# Patient Record
Sex: MALE | Employment: STUDENT | URBAN - METROPOLITAN AREA
[De-identification: names, ages, dates, MRNs, and addresses within clinical notes are randomized per-mention and may not be internally consistent; named-entity substitution may affect disease eponyms.]

---

## 2017-05-24 NOTE — PATIENT INSTRUCTIONS
We will let you know your test results as discussed: by phone for urgent results, otherwise by postal mail.     Please try to exercise and eat healthy to lose some weight and follow the other tips discussed at your visit.    Preventive Health Recommendations  Male Ages 18 - 25     Yearly exam:             See your health care provider every year in order to  o   Review health changes.   o   Discuss preventive care.    o   Review your medicines if your doctor has prescribed any.    You should be tested each year for STDs (sexually transmitted diseases).     Talk to your provider about cholesterol testing.      If you are at risk for diabetes, you should have a diabetes test (fasting glucose).    Shots: Get a flu shot each year. Get a tetanus shot every 10 years.     Nutrition:    Eat at least 5 servings of fruits and vegetables daily.     Eat whole-grain bread, whole-wheat pasta and brown rice instead of white grains and rice.     Talk to your provider about calcium and Vitamin D.     Lifestyle    Exercise for at least 150 minutes a week (30 minutes a day, 5 days a week). This will help you control your weight and prevent disease.     Limit alcohol to one drink per day.     No smoking.     Wear sunscreen to prevent skin cancer.     See your dentist every six months for an exam and cleaning.     Monthly Mole Check Chart    To do your monthly mole check, make copies of this chart. Then, fill in the date, the number of moles on each part of your body, and a description of each mole. For moles on your back or other areas you can't see, have a family member or friend do this for you. Be sure to use the ABCDEs of skin checks. This means checking moles for    Asymmetry (1 half looks different than the other half)    Border (regular is good, irregular is bad)    Color (varies from 1 area to another and may be tan, brown, or black)    Diameter (bigger than a pencil eraser)    Evolving (changing in size, shape, or  color).   Keep all of your completed charts and use them to track changes in your moles over time.    Seeking medical treatment  See your health care provider if your moles hurt, itch, ooze, bleed, thicken, or become crusty. Call your health care provider if your moles show signs of melanoma. These include a mole that has:      Asymmetry. The sides of the mole don t match    Border. The edges are ragged, notched, or blurred    Color. The color within the mole varies    Diameter. The mole is larger than 6 mm (size of a pencil eraser)    Evolving. The mole is getting larger or the shape or color of the mole is changing       0966-1123 [employer]. [address] [place] [address], [place] [id number]. All rights reserved. This information is not intended as a substitute for professional medical care. Always follow your healthcare professional's instructions.          Weight Management: Healthy Eating  Food is your body s fuel. You can t live without it. The key is to give your body enough nutrients and energy without eating too much. Reading food labels can help you make healthy choices. Also, learn new eating habits to manage your weight.     All the values on the label are based on one serving. The serving size is the average portion. Remember to multiply the values on the label by the number of servings you eat.   Eat less fat  A gram of fat has almost 2.5 times the calories of a gram of protein or carbohydrates. Try to balance your food choices so that only 20% to 35% of your calories comes from total fat. This means an average of 2  to 3  grams of fat for each 100 calories you eat.  Eat more fiber  High-fiber foods are digested more slowly than low-fiber foods, so you feel full longer. Try to get 31 grams of fiber each day. Foods high in fiber include:    Vegetables and fruits    Whole-grain or bran breads, pastas, and cereals    Legumes (beans) and peas  As you begin to eat more fiber, be sure to drink plenty  of water to keep your digestive system working smoothly.  Tips    Don t skip meals. This often leads to overeating later on. It s best to spread your eating throughout the day.    Eat a variety of foods, not just a few favorites.    If you find yourself eating when you re not hungry, ask yourself why. Many of us eat when we re bored, stressed, or just to be polite. Listen to your body. If you re not hungry, get busy doing something else instead of eating.    Eat slower, shooting for 20 to 30 minutes for each meal. It takes 20 minutes for your stomach to tell your brain that it s full.    Pay attention to what you eat. Don t read or watch TV during your meal.    6100-8475 The [employer]. [address] [place] [address], [place] [address]. All rights reserved. This information is not intended as a substitute for professional medical care. Always follow your healthcare professional's instructions.

## 2017-05-24 NOTE — PROGRESS NOTES
SUBJECTIVE:     CC: Steve Doran is an 27 year old male who presents for preventative health visit.     Healthy Habits:    Do you get at least three servings of calcium containing foods daily (dairy, green leafy vegetables, etc.)? yes    Amount of exercise or daily activities, outside of work: 3 day(s) per week    Problems taking medications regularly not applicable    Medication side effects: No    Have you had an eye exam in the past two years? yes    Do you see a dentist twice per year? 1 time yearly    Do you have sleep apnea, excessive snoring or daytime drowsiness?no    Would like to have his measles immune status checked. Will soon become a father.  Intermittent ETOH, tobacco and MJ use; advised moderation in ETOH and to avoid MJ and tobacco.    Has 2 moles he would like to have us take a look at; neither appear to have changed in the past month or so. No family history of skin cancer.       Today's PHQ-2 Score:   PHQ-2 ( 1999 Pfizer) 3/19/2013   Q1: Little interest or pleasure in doing things 0   Q2: Feeling down, depressed or hopeless 0   PHQ-2 Score 0       Abuse: Current or Past(Physical, Sexual or Emotional)- No  Do you feel safe in your environment - Yes    Social History   Substance Use Topics     Smoking status: Current Some Day Smoker     Smokeless tobacco: Never Used     Alcohol use Yes      Comment: 2 drinks a week     The patient does not drink >3 drinks per day nor >7 drinks per week.    Last PSA: No results found for: PSA    No results for input(s): CHOL, HDL, LDL, TRIG, CHOLHDLRATIO, NHDL in the last 96293 hours.    Reviewed orders with patient. Reviewed health maintenance and updated orders accordingly - Yes    Reviewed and updated as needed this visit by clinical staff         Reviewed and updated as needed this visit by Provider            ROS:  C: NEGATIVE for fever, chills, change in weight  I: NEGATIVE for worrisome rashes, moles or lesions  E: NEGATIVE for vision changes or  "irritation  ENT: NEGATIVE for ear, mouth and throat problems  R: NEGATIVE for significant cough or SOB  CV: NEGATIVE for chest pain, palpitations or peripheral edema  GI: NEGATIVE for nausea, abdominal pain, heartburn, or change in bowel habits   male: negative for dysuria, hematuria, decreased urinary stream, erectile dysfunction, urethral discharge  M: NEGATIVE for significant arthralgias or myalgia  N: NEGATIVE for weakness, dizziness or paresthesias  P: NEGATIVE for changes in mood or affect    Problem list, Medication list, Allergies, and Medical/Social/Surgical histories reviewed in [facility] and updated as appropriate.  OBJECTIVE:     /80  Pulse 63  Temp 97.9  F (36.6  C) (Oral)  Ht 5' 11\" (1.803 m)  Wt 218 lb (98.9 kg)  SpO2 99%  BMI 30.4 kg/m2  EXAM:  GENERAL: healthy, alert and no distress  EYES: Eyes grossly normal to inspection, PERRL and conjunctivae and sclerae normal  HENT: ear canals and TM's normal, nose and mouth without ulcers or lesions  NECK: no adenopathy, no asymmetry, masses, or scars and thyroid normal to palpation  RESP: lungs clear to auscultation - no rales, rhonchi or wheezes  CV: regular rate and rhythm, normal S1 S2, no S3 or S4, no murmur, click or rub, no peripheral edema and peripheral pulses strong  ABDOMEN: soft, nontender, no hepatosplenomegaly, no masses and bowel sounds normal  MS: no gross musculoskeletal defects noted, no edema  SKIN:   There is a ~ 1cm lesion on the left chest and a 0.8cm in diameter, more papular lesion on the la.  no suspicious lesions or rashes  NEURO: Normal strength and tone, mentation intact and speech normal  PSYCH: mentation appears normal, affect normal/bright      Results for orders placed or performed in visit on 05/25/17   Rubella Antibody IgG Quantitative   Result Value Ref Range    Rubella Antibody IgG Quantitative 10 IU/mL   Lipid Profile with reflex to direct LDL   Result Value Ref Range    Cholesterol 198 <200 mg/dL    " "Triglycerides 35 <150 mg/dL    HDL Cholesterol 68 >39 mg/dL    LDL Cholesterol Calculated 123 (H) <100 mg/dL    Non HDL Cholesterol 130 (H) <130 mg/dL   Glucose   Result Value Ref Range    Glucose 81 70 - 99 mg/dL   Rubeola Antibody IgG   Result Value Ref Range    Rubeola (Measles) Antibody IgG 1.9 (H) 0.0 - 0.8 AI      ASSESSMENT/PLAN:         ICD-10-CM    1. Routine general medical examination at a health care facility Z00.00 Lipid Profile with reflex to direct LDL     Glucose   2. Immunity status testing Z01.84 Rubella Antibody IgG Quantitative     Rubeola Antibody IgG     Patient Instructions:  We will let you know your test results as discussed: by phone for urgent results, otherwise by postal mail.     Please try to exercise and eat healthy to lose some weight and follow the other tips discussed at your visit.    Also given handouts (from [employer]) regarding monthly mole checks and tips regarding weight loss.     COUNSELING:  Reviewed preventive health counseling, as reflected in patient instructions         reports that he has been smoking.  He has never used smokeless tobacco. advised to try to quit tobacco use.    Estimated body mass index is 26.22 kg/(m^2) as calculated from the following:    Height as of 3/19/13: 5' 11\" (1.803 m).    Weight as of 3/19/13: 188 lb (85.3 kg).   Weight management plan: Discussed healthy diet and exercise guidelines and patient will follow up in 12 months in clinic to re-evaluate.    Counseling Resources:  ATP IV Guidelines  Pooled Cohorts Equation Calculator  FRAX Risk Assessment  ICSI Preventive Guidelines  Dietary Guidelines for Americans, 2010  USDA's MyPlate  ASA Prophylaxis  Lung CA Screening    Cammy Castaneda MD  [facility]  "

## 2017-05-25 ENCOUNTER — OFFICE VISIT (OUTPATIENT)
Dept: FAMILY MEDICINE | Facility: CLINIC | Age: 27
End: 2017-05-25
Payer: COMMERCIAL

## 2017-05-25 VITALS
BODY MASS INDEX: 30.52 KG/M2 | OXYGEN SATURATION: 99 % | HEART RATE: 63 BPM | HEIGHT: 71 IN | WEIGHT: 218 LBS | TEMPERATURE: 97.9 F | SYSTOLIC BLOOD PRESSURE: 130 MMHG | DIASTOLIC BLOOD PRESSURE: 80 MMHG

## 2017-05-25 DIAGNOSIS — Z01.84 IMMUNITY STATUS TESTING: ICD-10-CM

## 2017-05-25 DIAGNOSIS — Z00.00 ROUTINE GENERAL MEDICAL EXAMINATION AT A HEALTH CARE FACILITY: Primary | ICD-10-CM

## 2017-05-25 LAB
CHOLEST SERPL-MCNC: 198 MG/DL
GLUCOSE SERPL-MCNC: 81 MG/DL (ref 70–99)
HDLC SERPL-MCNC: 68 MG/DL
LDLC SERPL CALC-MCNC: 123 MG/DL
NONHDLC SERPL-MCNC: 130 MG/DL
TRIGL SERPL-MCNC: 35 MG/DL

## 2017-05-25 PROCEDURE — 82947 ASSAY GLUCOSE BLOOD QUANT: CPT | Performed by: INTERNAL MEDICINE

## 2017-05-25 PROCEDURE — 99395 PREV VISIT EST AGE 18-39: CPT | Performed by: INTERNAL MEDICINE

## 2017-05-25 PROCEDURE — 86762 RUBELLA ANTIBODY: CPT | Performed by: INTERNAL MEDICINE

## 2017-05-25 PROCEDURE — 80061 LIPID PANEL: CPT | Performed by: INTERNAL MEDICINE

## 2017-05-25 PROCEDURE — 36415 COLL VENOUS BLD VENIPUNCTURE: CPT | Performed by: INTERNAL MEDICINE

## 2017-05-25 PROCEDURE — 86765 RUBEOLA ANTIBODY: CPT | Performed by: INTERNAL MEDICINE

## 2017-05-25 ASSESSMENT — PAIN SCALES - GENERAL: PAINLEVEL: NO PAIN (0)

## 2017-05-25 NOTE — NURSING NOTE
"Chief Complaint   Patient presents with     Physical       Initial /90  Pulse 63  Temp 97.9  F (36.6  C) (Oral)  Ht 5' 11\" (1.803 m)  Wt 218 lb (98.9 kg)  SpO2 99%  BMI 30.4 kg/m2 Estimated body mass index is 30.4 kg/(m^2) as calculated from the following:    Height as of this encounter: 5' 11\" (1.803 m).    Weight as of this encounter: 218 lb (98.9 kg).  Medication Reconciliation: complete   Nancy Bridges MA    "

## 2017-05-25 NOTE — MR AVS SNAPSHOT
After Visit Summary   5/25/2017    Steve Doran    MRN: [id number]           Patient Information     Date Of Birth          1990        Visit Information        Provider Department      5/25/2017 10:10 AM Cammy Castnaeda MD [facility]        Today's Diagnoses     Immunity status testing    -  1    Routine general medical examination at a health care facility          Care Instructions    We will let you know your test results as discussed: by phone for urgent results, otherwise by postal mail.     Please try to exercise and eat healthy to lose some weight and follow the other tips discussed at your visit.    Preventive Health Recommendations  Male Ages 18 - 25     Yearly exam:             See your health care provider every year in order to  o   Review health changes.   o   Discuss preventive care.    o   Review your medicines if your doctor has prescribed any.    You should be tested each year for STDs (sexually transmitted diseases).     Talk to your provider about cholesterol testing.      If you are at risk for diabetes, you should have a diabetes test (fasting glucose).    Shots: Get a flu shot each year. Get a tetanus shot every 10 years.     Nutrition:    Eat at least 5 servings of fruits and vegetables daily.     Eat whole-grain bread, whole-wheat pasta and brown rice instead of white grains and rice.     Talk to your provider about calcium and Vitamin D.     Lifestyle    Exercise for at least 150 minutes a week (30 minutes a day, 5 days a week). This will help you control your weight and prevent disease.     Limit alcohol to one drink per day.     No smoking.     Wear sunscreen to prevent skin cancer.     See your dentist every six months for an exam and cleaning.     Monthly Mole Check Chart    To do your monthly mole check, make copies of this chart. Then, fill in the date, the number of moles on each part of your body, and a description of each mole. For moles  on your back or other areas you can't see, have a family member or friend do this for you. Be sure to use the ABCDEs of skin checks. This means checking moles for    Asymmetry (1 half looks different than the other half)    Border (regular is good, irregular is bad)    Color (varies from 1 area to another and may be tan, brown, or black)    Diameter (bigger than a pencil eraser)    Evolving (changing in size, shape, or color).   Keep all of your completed charts and use them to track changes in your moles over time.    Seeking medical treatment  See your health care provider if your moles hurt, itch, ooze, bleed, thicken, or become crusty. Call your health care provider if your moles show signs of melanoma. These include a mole that has:      Asymmetry. The sides of the mole don t match    Border. The edges are ragged, notched, or blurred    Color. The color within the mole varies    Diameter. The mole is larger than 6 mm (size of a pencil eraser)    Evolving. The mole is getting larger or the shape or color of the mole is changing       9975-2645 [employer]. [address] [place] [address]. All rights reserved. This information is not intended as a substitute for professional medical care. Always follow your healthcare professional's instructions.          Weight Management: Healthy Eating  Food is your body s fuel. You can t live without it. The key is to give your body enough nutrients and energy without eating too much. Reading food labels can help you make healthy choices. Also, learn new eating habits to manage your weight.     All the values on the label are based on one serving. The serving size is the average portion. Remember to multiply the values on the label by the number of servings you eat.   Eat less fat  A gram of fat has almost 2.5 times the calories of a gram of protein or carbohydrates. Try to balance your food choices so that only 20% to 35% of your calories comes from  total fat. This means an average of 2  to 3  grams of fat for each 100 calories you eat.  Eat more fiber  High-fiber foods are digested more slowly than low-fiber foods, so you feel full longer. Try to get 31 grams of fiber each day. Foods high in fiber include:    Vegetables and fruits    Whole-grain or bran breads, pastas, and cereals    Legumes (beans) and peas  As you begin to eat more fiber, be sure to drink plenty of water to keep your digestive system working smoothly.  Tips    Don t skip meals. This often leads to overeating later on. It s best to spread your eating throughout the day.    Eat a variety of foods, not just a few favorites.    If you find yourself eating when you re not hungry, ask yourself why. Many of us eat when we re bored, stressed, or just to be polite. Listen to your body. If you re not hungry, get busy doing something else instead of eating.    Eat slower, shooting for 20 to 30 minutes for each meal. It takes 20 minutes for your stomach to tell your brain that it s full.    Pay attention to what you eat. Don t read or watch TV during your meal.    1031-8611 [employer]. [address] [place] [address], [place] [address]. All rights reserved. This information is not intended as a substitute for professional medical care. Always follow your healthcare professional's instructions.                Follow-ups after your visit        Who to contact     If you have questions or need follow up information about today's clinic visit or your schedule please contact [facility] directly at [phone].  Normal or non-critical lab and imaging results will be communicated to you by MyChart, letter or phone within 4 business days after the clinic has received the results. If you do not hear from us within 7 days, please contact the clinic through MyChart or phone. If you have a critical or abnormal lab result, we will notify you by phone as soon as possible.  Submit refill requests  "through [employer] or call your pharmacy and they will forward the refill request to us. Please allow 3 business days for your refill to be completed.          Additional Information About Your Visit        [employer] Information     [employer] lets you send messages to your doctor, view your test results, renew your prescriptions, schedule appointments and more. To sign up, go to [email or web address] . Click on \"Log in\" on the left side of the screen, which will take you to the Welcome page. Then click on \"Sign up Now\" on the right side of the page.     You will be asked to enter the access code listed below, as well as some personal information. Please follow the directions to create your username and password.     Your access code is: [email or web address]  Expires: 2017 11:19 AM     Your access code will  in 90 days. If you need help or a new code, please call your [facility] clinic or [phone].        Care EveryWhere ID     This is your Care EveryWhere ID. This could be used by other organizations to access your [facility] medical records  [id number]        Your Vitals Were     Pulse Temperature Height Pulse Oximetry BMI (Body Mass Index)       63 97.9  F (36.6  C) (Oral) 5' 11\" (1.803 m) 99% 30.4 kg/m2        Blood Pressure from Last 3 Encounters:   17 130/80   13 112/74    Weight from Last 3 Encounters:   17 218 lb (98.9 kg)   13 188 lb (85.3 kg)              We Performed the Following     Glucose     Lipid Profile with reflex to direct LDL     Rubella Antibody IgG Quantitative        Primary Care Provider    None Specified       No primary provider on file.        Thank you!     Thank you for choosing [facility]  for your care. Our goal is always to provide you with excellent care. Hearing back from our patients is one way we can continue to improve our services. Please take a few minutes to complete the written survey that you may receive in the mail after your visit with " us. Thank you!             Your Updated Medication List - Protect others around you: Learn how to safely use, store and throw away your medicines at www.disposemymeds.org.      Notice  As of 5/25/2017 11:19 AM    You have not been prescribed any medications.

## 2017-05-26 LAB
MEV IGG SER QL IA: 1.9 AI (ref 0–0.8)
RUBV IGG SERPL IA-ACNC: 10 IU/ML

## 2017-05-26 NOTE — PROGRESS NOTES
Dear Steve,     Your test results are attached.    Your fasting blood sugar is within normal limits.  You do not have evidence of diabetes.  Your cholesterol levels are within normal limits for you.  You have antibodies against measles, and would be considered to be immune against measles.      Please notify me via [employer] or contact the clinic at [phone] if you have any questions.    Cammy Castaneda MD

## 2017-11-27 ENCOUNTER — OFFICE VISIT (OUTPATIENT)
Dept: FAMILY MEDICINE | Facility: CLINIC | Age: 27
End: 2017-11-27
Payer: COMMERCIAL

## 2017-11-27 VITALS
OXYGEN SATURATION: 97 % | TEMPERATURE: 99.5 F | HEIGHT: 71 IN | BODY MASS INDEX: 30.52 KG/M2 | HEART RATE: 73 BPM | WEIGHT: 218 LBS | SYSTOLIC BLOOD PRESSURE: 106 MMHG | DIASTOLIC BLOOD PRESSURE: 70 MMHG

## 2017-11-27 DIAGNOSIS — J06.9 UPPER RESPIRATORY TRACT INFECTION, UNSPECIFIED TYPE: Primary | ICD-10-CM

## 2017-11-27 LAB
DEPRECATED S PYO AG THROAT QL EIA: NORMAL
FLUAV+FLUBV AG SPEC QL: NEGATIVE
FLUAV+FLUBV AG SPEC QL: NEGATIVE
SPECIMEN SOURCE: NORMAL
SPECIMEN SOURCE: NORMAL

## 2017-11-27 PROCEDURE — 99213 OFFICE O/P EST LOW 20 MIN: CPT | Performed by: FAMILY MEDICINE

## 2017-11-27 PROCEDURE — 87081 CULTURE SCREEN ONLY: CPT | Performed by: FAMILY MEDICINE

## 2017-11-27 PROCEDURE — 87804 INFLUENZA ASSAY W/OPTIC: CPT | Performed by: FAMILY MEDICINE

## 2017-11-27 PROCEDURE — 87880 STREP A ASSAY W/OPTIC: CPT | Performed by: FAMILY MEDICINE

## 2017-11-27 NOTE — PROGRESS NOTES
"  SUBJECTIVE:   Steve Doran is a 27 year old male who presents to clinic today for the following health issues:      RESPIRATORY SYMPTOMS      Duration: 36 hours    Description  nasal congestion, cough, fever to 101, headaches, lots of mucus and chest conngestion    Severity: severe    Accompanying signs and symptoms: None    History (predisposing factors):  none    Precipitating or alleviating factors: None    Therapies tried and outcome:  Mucinex and Nyquil which has helped    I have reviewed the patient's medical history in detail and updated the computerized patient record.     ROS:  CONSTITUTIONAL: POSITIVE for fever   I: NEGATIVE for worrisome rashes, moles or lesions  E: NEGATIVE for vision changes or irritation  ENT/MOUTH: nasal congestion, rhinorrhea-purulent and sinus pressure  RESP:cough-productive  CV: NEGATIVE for chest pain/chest pressure    OBJECTIVE:     /70 (BP Location: Right arm, Patient Position: Chair, Cuff Size: Adult Large)  Pulse 73  Temp 99.5  F (37.5  C) (Oral)  Ht 5' 11\" (1.803 m)  Wt 218 lb (98.9 kg)  SpO2 97%  BMI 30.4 kg/m2  Body mass index is 30.4 kg/(m^2).  GENERAL: alert, no distress and obese  EYES: Eyes grossly normal to inspection, PERRL and conjunctivae and sclerae normal  HENT: ear canals and TM's normal, nose and mouth without ulcers or lesions  NECK: no adenopathy, no asymmetry, masses, or scars and thyroid normal to palpation  RESP: lungs clear to auscultation - no rales, rhonchi or wheezes  CV: regular rate and rhythm, normal S1 S2, no S3 or S4, no murmur, click or rub   MS: extremities normal- no gross deformities noted  PSYCH: mentation appears normal, affect normal/bright    Diagnostic Test Results:  Results for orders placed or performed in visit on 11/27/17   Influenza A/B antigen   Result Value Ref Range    Influenza A/B Agn Specimen Nasopharyngeal     Influenza A Negative NEG^Negative    Influenza B Negative NEG^Negative   Rapid strep screen   Result Value " Ref Range    Specimen Description Throat     Rapid Strep A Screen       NEGATIVE: No Group A streptococcal antigen detected by immunoassay, await culture report.       ASSESSMENT/PLAN:   (J06.9) Upper respiratory tract infection, unspecified type  (primary encounter diagnosis)  Plan: Influenza A/B antigen, Rapid strep screen, Beta        strep group A culture        Symptomatic care.  Return to clinic for persistence, recurrence or new symptoms.           See Patient Instructions    Lakesha Copeland MD  [facility]

## 2017-11-27 NOTE — PATIENT INSTRUCTIONS
[facility]    If you have any questions regarding to your visit please contact your care team:       Team Red:   Clinic Hours Telephone Number   Dr. Lakesha Wells, NP   7am-7pm  Monday - Thursday   7am-5pm  Fridays  [phone]  (Appointment scheduling available 24/7)    Questions about your visit?   Team Line  [phone]   [facility] [place] [facility] 11am-9pm Monday-Friday Saturday-Sunday- 9am-5pm   [place] - 5pm-9pm Monday-Friday Saturday-Sunday- 9am-5pm  [phone] - [place]   [phone] - [place]       What options do I have for visits at the clinic other than the traditional office visit?  To expand how we care for you, many of our providers are utilizing electronic visits (e-visits) and telephone visits, when medically appropriate, for interactions with their patients rather than a visit in the clinic.   We also offer nurse visits for many medical concerns. Just like any other service, we will bill your insurance company for this type of visit based on time spent on the phone with your provider. Not all insurance companies cover these visits. Please check with your medical insurance if this type of visit is covered. You will be responsible for any charges that are not paid by your insurance.      E-visits via Quantum4D:  generally incur a $35.00 fee.  Telephone visits:  Time spent on the phone: *charged based on time that is spent on the phone in increments of 10 minutes. Estimated cost:   5-10 mins $30.00   11-20 mins. $59.00   21-30 mins. $85.00     Use Cinedigmt (secure email communication and access to your chart) to send your primary care provider a message or make an appointment. Ask someone on your Team how to sign up for Quantum4D.  For a Price Quote for your services, please call our Consumer Price Line at [phone].      As always, Thank you for trusting us with your health care needs!  Discharged  by ADRIAN Plummer

## 2017-11-27 NOTE — LETTER
November 27, 2017      Steve Doran  [address] [facility] [place] [address]        To Whom It May Concern:    Steve Doran was seen in our clinic. Excuse his absence today due to illness. He may return to work without restrictions.      Sincerely,        Lakesha Copeland MD

## 2017-11-27 NOTE — PROGRESS NOTES
Your results are normal.  Your final test results are pending.  Please check your chart again within 3 to 5 days. You will receive further instruction when your full test result panel is complete.    Lakesha Copeland MD

## 2017-11-27 NOTE — NURSING NOTE
"Chief Complaint   Patient presents with     URI       Initial /70 (BP Location: Right arm, Patient Position: Chair, Cuff Size: Adult Large)  Pulse 73  Temp 99.5  F (37.5  C) (Oral)  Ht 5' 11\" (1.803 m)  Wt 218 lb (98.9 kg)  SpO2 97%  BMI 30.4 kg/m2 Estimated body mass index is 30.4 kg/(m^2) as calculated from the following:    Height as of this encounter: 5' 11\" (1.803 m).    Weight as of this encounter: 218 lb (98.9 kg).  Medication Reconciliation: complete    "

## 2017-11-28 LAB
BACTERIA SPEC CULT: NORMAL
SPECIMEN SOURCE: NORMAL

## 2018-04-03 ENCOUNTER — OFFICE VISIT (OUTPATIENT)
Dept: PODIATRY | Facility: CLINIC | Age: 28
End: 2018-04-03
Payer: COMMERCIAL

## 2018-04-03 ENCOUNTER — RADIANT APPOINTMENT (OUTPATIENT)
Dept: GENERAL RADIOLOGY | Facility: CLINIC | Age: 28
End: 2018-04-03
Attending: PODIATRIST
Payer: COMMERCIAL

## 2018-04-03 VITALS
DIASTOLIC BLOOD PRESSURE: 72 MMHG | SYSTOLIC BLOOD PRESSURE: 132 MMHG | BODY MASS INDEX: 28.44 KG/M2 | HEIGHT: 72 IN | WEIGHT: 210 LBS | HEART RATE: 56 BPM

## 2018-04-03 DIAGNOSIS — M79.672 PAIN IN BOTH FEET: ICD-10-CM

## 2018-04-03 DIAGNOSIS — M21.6X1 PRONATION OF BOTH FEET: ICD-10-CM

## 2018-04-03 DIAGNOSIS — M21.6X2 PRONATION OF BOTH FEET: ICD-10-CM

## 2018-04-03 DIAGNOSIS — M21.6X2 PRONATION OF BOTH FEET: Primary | ICD-10-CM

## 2018-04-03 DIAGNOSIS — M21.6X1 PRONATION OF BOTH FEET: Primary | ICD-10-CM

## 2018-04-03 DIAGNOSIS — M79.671 PAIN IN BOTH FEET: ICD-10-CM

## 2018-04-03 PROCEDURE — 73630 X-RAY EXAM OF FOOT: CPT | Mod: RT

## 2018-04-03 PROCEDURE — 99203 OFFICE O/P NEW LOW 30 MIN: CPT | Performed by: PODIATRIST

## 2018-04-03 PROCEDURE — 73630 X-RAY EXAM OF FOOT: CPT | Mod: LT

## 2018-04-03 NOTE — LETTER
"    4/3/2018         RE: Steve Doran  [address] [place] [place] [address]        Dear Colleague,    Thank you for referring your patient, Steve Doran, to the [facility] [place]. Please see a copy of my visit note below.    S:  Complains of bilateral foot pain.  Points to medial arch.  Has had this for 3 years.  Describes it as a burning pain.  Aggrevated by activity and relieved by rest.  Getting worse lately.  Standing at work, works for [employer].  Had orthotics in the past but wore these out.      ROS:  A 10-point review of systems was performed and is positive for that noted in the HPI and as seen above.  All other areas are negative.        No Known Allergies    No current outpatient prescriptions on file.       Patient Active Problem List   Diagnosis     CARDIOVASCULAR SCREENING; LDL GOAL LESS THAN 130     Obesity       Past Medical History:   Diagnosis Date     Obesity        History reviewed. No pertinent surgical history.    Family History   Problem Relation Age of Onset     Hypertension Father      Hypertension Paternal Grandmother      DIABETES Paternal Grandmother        Social History   Substance Use Topics     Smoking status: Never Smoker     Smokeless tobacco: Never Used     Alcohol use Yes      Comment: 2 drinks a week         Exam:    Vitals: /72 (BP Location: Left arm, Patient Position: Sitting, Cuff Size: Adult Large)  Pulse 56  Ht 5' 11.5\" (1.816 m)  Wt 210 lb (95.3 kg)  BMI 28.88 kg/m2  BMI: Body mass index is 28.88 kg/(m^2).  Height: 5' 11.5\"    Constitutional/ general:  Pt is in no apparent distress, appears well-nourished.  Cooperative with history and physical exam.     Psych:  The patient answered questions appropriately.  Normal affect.  Seems to have reasonable expectations, in terms of treatment.     Eyes:  Visual scanning/ tracking without deficit.     Ears:  Response to auditory stimuli is normal.  negative hearing aid devices.  Auricles in proper " alignment.     Lymphatic:  Popliteal lymph nodes not enlarged.     Lungs:  Non labored breathing, non labored speech. No cough.  No audible wheezing. Even, quiet breathing.       Vascular:  positive pedal pulses bilaterally for both the DP and PT arteries.  CFT < 3 sec.  negative ankle edema.  positive pedal hair growth.    Neuro:  Alert and oriented x 3. Coordinated gait.  Light touch sensation is intact to the L4, L5, S1 distributions. No obvious deficits.  No evidence of neurological-based weakness, spasticity, or contracture in the lower extremities.      Derm: Normal texture and turgor.  No erythema, ecchymosis, or cyanosis.      Musculoskeletal:    Lower extremity muscle strength is normal.  Patient is ambulatory without an assistive device or brace.  No gross deformities.  Normal ROM all fore foot and rearfoot joints.  No equinus.  With weightbearing patient has bilateral pronation.  No pain with palpation or ROM.  No pain with stressing any muscle compartments.  Good calcaneal iversion with foot flexion.  no erythema edema or ecchymosis or masses noted.    Radiographic Exam:  X-Ray Findings:  Normal       A:  Pronation causing pain    P:  X-rays taken today.  RX for custom orthotics.  Discussed importance of wearing these in a good shoe at all times to prevent future problems.  Discussed good house shoes at all times until resolved.  Avoid activities that bother this.  Will call if he needs new pair in the future.   RETURN TO CLINIC PRN.    Jonatan Francisco DPM, FACFAS           Again, thank you for allowing me to participate in the care of your patient.        Sincerely,        Jonatan Francisco DPM

## 2018-04-03 NOTE — PATIENT INSTRUCTIONS
We wish you continued good healing. If you have any questions or concerns, please do not hesitate to contact us at [phone]    Please remember to call and schedule a follow up appointment if one was recommended at your earliest convenience.   PODIATRY CLINIC HOURS  TELEPHONE NUMBER    Dr. Jonatan Francisco D.P.M [facility]    Clinics:  [facility]    [facility]   Tuesday 1PM-6PM  [place]  Wednesday 7AM-2PM  [facility]  Thursday 10AM-6PM  [place]  Friday 7AM-3PM  [place]  Specialty schedulers:   [phone] to make an appointment with any Specialty Provider.        Urgent Care locations:    [facility] Monday-Friday 5 pm - 9 pm. Saturday-Sunday 9 am -5pm    Monday-Friday 11 am - 9 pm Saturday 9 am - 5 pm     Monday-Sunday 12 noon-8PM [phone]     If you need a medication refill, please contact us you may need lab work and/or a follow up visit prior to your refill (i.e. Antifungal medications).    [email or web address] (secure e-mail communication and access to your chart) to send a message or to make an appointment.    If MRI needed please call [facility] at [phone]        Weight management plan: Patient was referred to their PCP to discuss a diet and exercise plan.

## 2018-04-03 NOTE — PROGRESS NOTES
"S:  Complains of bilateral foot pain.  Points to medial arch.  Has had this for 3 years.  Describes it as a burning pain.  Aggrevated by activity and relieved by rest.  Getting worse lately.  Standing at work, works for [employer].  Had orthotics in the past but wore these out.      ROS:  A 10-point review of systems was performed and is positive for that noted in the HPI and as seen above.  All other areas are negative.        No Known Allergies    No current outpatient prescriptions on file.       Patient Active Problem List   Diagnosis     CARDIOVASCULAR SCREENING; LDL GOAL LESS THAN 130     Obesity       Past Medical History:   Diagnosis Date     Obesity        History reviewed. No pertinent surgical history.    Family History   Problem Relation Age of Onset     Hypertension Father      Hypertension Paternal Grandmother      DIABETES Paternal Grandmother        Social History   Substance Use Topics     Smoking status: Never Smoker     Smokeless tobacco: Never Used     Alcohol use Yes      Comment: 2 drinks a week         Exam:    Vitals: /72 (BP Location: Left arm, Patient Position: Sitting, Cuff Size: Adult Large)  Pulse 56  Ht 5' 11.5\" (1.816 m)  Wt 210 lb (95.3 kg)  BMI 28.88 kg/m2  BMI: Body mass index is 28.88 kg/(m^2).  Height: 5' 11.5\"    Constitutional/ general:  Pt is in no apparent distress, appears well-nourished.  Cooperative with history and physical exam.     Psych:  The patient answered questions appropriately.  Normal affect.  Seems to have reasonable expectations, in terms of treatment.     Eyes:  Visual scanning/ tracking without deficit.     Ears:  Response to auditory stimuli is normal.  negative hearing aid devices.  Auricles in proper alignment.     Lymphatic:  Popliteal lymph nodes not enlarged.     Lungs:  Non labored breathing, non labored speech. No cough.  No audible wheezing. Even, quiet breathing.       Vascular:  positive pedal pulses bilaterally for both the DP and PT arteries.  " CFT < 3 sec.  negative ankle edema.  positive pedal hair growth.    Neuro:  Alert and oriented x 3. Coordinated gait.  Light touch sensation is intact to the L4, L5, S1 distributions. No obvious deficits.  No evidence of neurological-based weakness, spasticity, or contracture in the lower extremities.      Derm: Normal texture and turgor.  No erythema, ecchymosis, or cyanosis.      Musculoskeletal:    Lower extremity muscle strength is normal.  Patient is ambulatory without an assistive device or brace.  No gross deformities.  Normal ROM all fore foot and rearfoot joints.  No equinus.  With weightbearing patient has bilateral pronation.  No pain with palpation or ROM.  No pain with stressing any muscle compartments.  Good calcaneal iversion with foot flexion.  no erythema edema or ecchymosis or masses noted.    Radiographic Exam:  X-Ray Findings:  Normal       A:  Pronation causing pain    P:  X-rays taken today.  RX for custom orthotics.  Discussed importance of wearing these in a good shoe at all times to prevent future problems.  Discussed good house shoes at all times until resolved.  Avoid activities that bother this.  Will call if he needs new pair in the future.   RETURN TO CLINIC PRN.    Jonatan Francisco DPM, FACFAS

## 2018-04-03 NOTE — MR AVS SNAPSHOT
After Visit Summary   4/3/2018    Steve Doran    MRN: [id number]           Patient Information     Date Of Birth          1990        Visit Information        Provider Department      4/3/2018 1:00 PM Jonatan Francisco, DPM [facility]        Today's Diagnoses     Pronation of both feet    -  1    Pain in both feet          Care Instructions    We wish you continued good healing. If you have any questions or concerns, please do not hesitate to contact us at [phone]    Please remember to call and schedule a follow up appointment if one was recommended at your earliest convenience.   PODIATRY CLINIC HOURS  TELEPHONE NUMBER    Dr. Jonatan VICKERSPLEONEL FAC FAS    Clinics:  [facility]    [facility]   Tuesday 1PM-6PM  [place]  Wednesday 7AM-2PM  [facility]  Thursday 10AM-6PM  [place]  Friday 7AM-3PM  [place]  Specialty schedulers:   [phone] to make an appointment with any Specialty Provider.        Urgent Care locations:    [facility] Monday-Friday 5 pm - 9 pm. Saturday-Sunday 9 am -5pm    Monday-Friday 11 am - 9 pm Saturday 9 am - 5 pm     Monday-Sunday 12 noon-8PM [phone]     If you need a medication refill, please contact us you may need lab work and/or a follow up visit prior to your refill (i.e. Antifungal medications).    Purcht (secure e-mail communication and access to your chart) to send a message or to make an appointment.    If MRI needed please call [facility] at [phone]        Weight management plan: Patient was referred to their PCP to discuss a diet and exercise plan.            Follow-ups after your visit        Who to contact     If you have questions or need follow up information about today's clinic visit or your schedule please contact [facility]  "[place] directly at [phone].  Normal or non-critical lab and imaging results will be communicated to you by MyChart, letter or phone within 4 business days after the clinic has received the results. If you do not hear from us within 7 days, please contact the clinic through [email or web address] or phone. If you have a critical or abnormal lab result, we will notify you by phone as soon as possible.  Submit refill requests through iPeen or call your pharmacy and they will forward the refill request to us. Please allow 3 business days for your refill to be completed.          Additional Information About Your Visit        iPeen Information     iPeen gives you secure access to your electronic health record. If you see a primary care provider, you can also send messages to your care team and make appointments. If you have questions, please call your primary care clinic.  If you do not have a primary care provider, please call [phone] and they will assist you.        Care EveryWhere ID     This is your Care EveryWhere ID. This could be used by other organizations to access your [place] medical records  [id number]        Your Vitals Were     Pulse Height BMI (Body Mass Index)             56 5' 11.5\" (1.816 m) 28.88 kg/m2          Blood Pressure from Last 3 Encounters:   04/03/18 132/72   11/27/17 106/70   05/25/17 130/80    Weight from Last 3 Encounters:   04/03/18 210 lb (95.3 kg)   11/27/17 218 lb (98.9 kg)   05/25/17 218 lb (98.9 kg)               Primary Care Provider Office Phone # Fax #    Cammy Castaneda [phone] [address] [address] [facility] [address]        Equal Access to Services     [facility]: Hadii joann Gage, wakenrickda luanita, ophelia kaalmada mesha greene. So [facility] [phone].    ATENCIÓN: Si habla español, tiene a bonilla disposición servicios gratuitos de asistencia lingüística. Llame al [phone].    We comply " with applicable federal civil rights laws and [place] laws. We do not discriminate on the basis of race, color, national origin, age, disability, sex, sexual orientation, or gender identity.            Thank you!     Thank you for choosing [facility]  for your care. Our goal is always to provide you with excellent care. Hearing back from our patients is one way we can continue to improve our services. Please take a few minutes to complete the written survey that you may receive in the mail after your visit with us. Thank you!             Your Updated Medication List - Protect others around you: Learn how to safely use, store and throw away your medicines at www.disposemymeds.org.      Notice  As of 4/3/2018  1:30 PM    You have not been prescribed any medications.

## 2018-09-12 ENCOUNTER — OFFICE VISIT (OUTPATIENT)
Dept: DERMATOLOGY | Facility: CLINIC | Age: 28
End: 2018-09-12
Payer: COMMERCIAL

## 2018-09-12 DIAGNOSIS — L81.2 FRECKLES: ICD-10-CM

## 2018-09-12 DIAGNOSIS — D48.5 NEOPLASM OF UNCERTAIN BEHAVIOR OF SKIN: Primary | ICD-10-CM

## 2018-09-12 DIAGNOSIS — D23.62 DERMATOFIBROMA OF LEFT UPPER EXTREMITY: ICD-10-CM

## 2018-09-12 ASSESSMENT — PAIN SCALES - GENERAL
PAINLEVEL: NO PAIN (0)
PAINLEVEL: NO PAIN (0)

## 2018-09-12 NOTE — NURSING NOTE
Chief Complaint   Patient presents with     Skin Check     Patient is here for a mole check on his left chest.     Myesha Childs CMA

## 2018-09-12 NOTE — NURSING NOTE
Lidocaine   3mL once for one use, starting 9/12/2018 ending 9/12/2018,  2mL disp, R-0, injection  Injected by Lani Riley, CMA

## 2018-09-12 NOTE — PATIENT INSTRUCTIONS

## 2018-09-12 NOTE — MR AVS SNAPSHOT
After Visit Summary   9/12/2018    Steve Doran    MRN: [id number]           Patient Information     Date Of Birth          1990        Visit Information        Provider Department      9/12/2018 10:15 AM Melina Hanson PA-C [facility]        Today's Diagnoses     Neoplasm of uncertain behavior of skin    -  1    Dermatofibroma of left upper extremity          Care Instructions    Wound Care After a Biopsy    What is a skin biopsy?  A skin biopsy allows the doctor to examine a very small piece of tissue under the microscope to determine the diagnosis and the best treatment for the skin condition. A local anesthetic (numbing medicine)  is injected with a very small needle into the skin area to be tested. A small piece of skin is taken from the area. Sometimes a suture (stitch) is used.     What are the risks of a skin biopsy?  I will experience scar, bleeding, swelling, pain, crusting and redness. I may experience incomplete removal or recurrence. Risks of this procedure are excessive bleeding, bruising, infection, nerve damage, numbness, thick (hypertrophic or keloidal) scar and non-diagnostic biopsy.    How should I care for my wound for the first 24 hours?    Keep the wound dry and covered for 24 hours    If it bleeds, hold direct pressure on the area for 15 minutes. If bleeding does not stop then go to the emergency room    Avoid strenuous exercise the first 1-2 days or as your doctor instructs you    How should I care for the wound after 24 hours?    After 24 hours, remove the bandage    You may bathe or shower as normal    If you had a scalp biopsy, you can shampoo as usual and can use shower water to clean the biopsy site daily    Clean the wound twice a day with gentle soap and water    Do not scrub, be gentle    Apply white petroleum/Vaseline after cleaning the wound with a cotton swab or a clean finger, and keep the site covered with a Bandaid /bandage. Bandages are not  necessary with a scalp biopsy    If you are unable to cover the site with a Bandaid /bandage, re-apply ointment 2-3 times a day to keep the site moist. Moisture will help with healing    Avoid strenuous activity for first 1-2 days    Avoid lakes, rivers, pools, and oceans until the stitches are removed or the site is healed    How do I clean my wound?    Wash hands thoroughly with soap or use hand  before all wound care    Clean the wound with gentle soap and water    Apply white petroleum/Vaseline  to wound after it is clean    Replace the Bandaid /bandage to keep the wound covered for the first few days or as instructed by your doctor    If you had a scalp biopsy, warm shower water to the area on a daily basis should suffice    What should I use to clean my wound?     Cotton-tipped applicators (Qtips )    White petroleum jelly (Vaseline ). Use a clean new container and use Q-tips to apply.    Bandaids   as needed    Gentle soap     How should I care for my wound long term?    Do not get your wound dirty    Keep up with wound care for one week or until the area is healed.    A small scab will form and fall off by itself when the area is completely healed. The area will be red and will become pink in color as it heals. Sun protection is very important for how your scar will turn out. Sunscreen with an SPF 30 or greater is recommended once the area is healed.    If you have stitches, stitches need to be removed in 14 days. You may return to our clinic for this or you may have it done locally at your doctor s office.    You should have some soreness but it should be mild and slowly go away over several days. Talk to your doctor about using tylenol for pain,    When should I call my doctor?  If you have increased:     Pain or swelling    Pus or drainage (clear or slightly yellow drainage is ok)    Temperature over 100F    Spreading redness or warmth around wound    When will I hear about my results?  The  biopsy results can take 2-3 weeks to come back. The clinic will call you with the results, send you a Getaround message, or have you schedule a follow-up clinic or phone time to discuss the results. Contact our clinics if you do not hear from us in 3 weeks.     Who should I call with questions?    [facility]: [phone]     [facility]: [phone]    For urgent needs outside of business hours call the [facility] at [phone] and ask for the dermatology resident on call            Follow-ups after your visit        Who to contact     Please call your clinic at [phone] to:    Ask questions about your health    Make or cancel appointments    Discuss your medicines    Learn about your test results    Speak to your doctor            Additional Information About Your Visit        CalcivisharRed LaGoon Information     Gaopeng gives you secure access to your electronic health record. If you see a primary care provider, you can also send messages to your care team and make appointments. If you have questions, please call your primary care clinic.  If you do not have a primary care provider, please call [phone] and they will assist you.      Gaopeng is an electronic gateway that provides easy, online access to your medical records. With Gaopeng, you can request a clinic appointment, read your test results, renew a prescription or communicate with your care team.     To access your existing account, please contact your [facility] or call [phone] for assistance.        Care EveryWhere ID     This is your Care EveryWhere ID. This could be used by other organizations to access your [place] medical records  [id number]         Blood Pressure from Last 3 Encounters:   04/03/18 132/72   11/27/17 106/70   05/25/17 130/80    Weight from Last 3 Encounters:   04/03/18 95.3 kg (210 lb)   11/27/17 98.9 kg (218 lb)   05/25/17 98.9  kg (218 lb)              We Performed the Following     BIOPSY SKIN/SUBQ/MUC MEM, SINGLE LESION     Dermatological path order and indications        Primary Care Provider Office Phone # Fax #    Cammy Castaneda [phone] [address] [address] [facility] [address]        Equal Access to Services     [facility]: Hadii aad ku hadasho Soomaali, waaxda luqadaha, qaybta kaalmada adeegyada, waxay idiin hayaan adeeg kharataylor laReneaan . So [facility] [phone].    ATENCIÓN: Si habla español, tiene a bonilla disposición servicios gratuitos de asistencia lingüística. Llame al [phone].    We comply with applicable federal civil rights laws and [place] laws. We do not discriminate on the basis of race, color, national origin, age, disability, sex, sexual orientation, or gender identity.            Thank you!     Thank you for choosing [facility]  for your care. Our goal is always to provide you with excellent care. Hearing back from our patients is one way we can continue to improve our services. Please take a few minutes to complete the written survey that you may receive in the mail after your visit with us. Thank you!             Your Updated Medication List - Protect others around you: Learn how to safely use, store and throw away your medicines at www.disposemymeds.org.      Notice  As of 9/12/2018 10:54 AM    You have not been prescribed any medications.

## 2018-09-12 NOTE — LETTER
9/12/2018       RE: Steve Doran  [id number] [facility] [address]     Dear Colleague,    Thank you for referring your patient, Steve Doran, to the [facility] DERMATOLOGY at [facility]. Please see a copy of my visit note below.    [facility] Dermatology Note      Dermatology Problem List:  0. NUB, left chest- s/p bx 9/12/18     Encounter Date: Sep 12, 2018    CC:  Chief Complaint   Patient presents with     Skin Check     Patient is here for a mole check on his left chest.     History of Present Illness:  Mr. Steve Doran is a 28 year old male who presents in self referral for a mole check as a new patient. There is no family history of skin cancer. He thinks that his mother has previously had moles removed, but he is unsure if these were found to be benign or not. However, he admits to frequent sun exposure over his lifetime on areas that are not covered by clothing. He does not use sun screen. He denies any tanning bed use over his lifetime.     Today he reports a mole of concern on his left side of his chest. He first noticed it 1-2 years ago. He mentioned the mole to his PCP- who then recommended he get it removed- but he never followed up with this.     Otherwise the patient reports no additional painful, bleeding, nonhealing or pruritic lesions and denies any new or changing moles.      Past Medical History:   Patient Active Problem List   Diagnosis     CARDIOVASCULAR SCREENING; LDL GOAL LESS THAN 130     Obesity     Past Medical History:   Diagnosis Date     Obesity      No past surgical history on file.    Social History:   reports that he has never smoked. He has never used smokeless tobacco. He reports that he drinks alcohol. He reports that he does not use illicit drugs.    Family History:  Family History   Problem Relation Age of Onset     Hypertension Father      Hypertension Paternal Grandmother      Diabetes Paternal Grandmother         Medications:  No current outpatient prescriptions on file.       No Known Allergies    Review of Systems:  -Skin/Heme New Pt: The patient admits to frequent sun exposure. The patient denies excessive scarring or problems healing except as per HPI. The patient denies excessive bleeding. Skin as per HPI. No additional skin concerns  -Constitutional: The patient is generally feeling well.    Physical exam:  Vitals: There were no vitals taken for this visit.  GEN: This is a well developed, well-nourished male in no acute distress, in a pleasant mood.    Carter skin type IV.   SKIN: Waist-up skin, which includes the head/face, neck, both arms, chest, back, abdomen, digits and/or nails was examined.  - There is a 1 cm slightly variegated brown patch on the left chest   -There is a firm tan/flesh colored papule that dimples with lateral pressure on the left upper arm.  -Scattered brown macules on sun exposed areas.  -No other lesions of concern on areas examined.       Impression/Plan:  1. Neoplasm of uncertain behavior on the left chest. The differential diagnosis includes mm vs dysplastic nevus .   After discussion of benefits and risks including but not limited to bleeding, infection, scar, incomplete removal, recurrence, and non-diagnostic biopsy, written consent and photographs were obtained. The area was cleaned with isopropyl alcohol. 3.0 mL of 1% lidocaine with epinephrine was injected to obtain adequate anesthesia of the lesion on the left chest. A shave biopsy was performed. Hemostasis was achieved with aluminium chloride. Vaseline and a sterile dressing were applied. The patient tolerated the procedure and no complications were noted. The patient was provided with verbal and written post care instructions.     2. Solar lentigines     Reassured of benign nature.     Recommend regular use of sun protection     3. Dermatofibroma, left upper arm    Reassured of benign nature.     No further intervention  required    Follow-up PRN for new or changing lesions     Staff Involved:  Scribe/Staff    Scribe Disclosure:   I, Elly Ybarra, am serving as a scribe to document services personally performed by Melina Hanson PA-C, based on data collection and the provider's statements to me.    Provider Disclosure:   The documentation recorded by the scribe accurately reflects the services I personally performed and the decisions made by me.    All risks, benefits and alternatives were discussed with patient.  Patient is in agreement and understands the assessment and plan.  All questions were answered.  Sun Screen Education was given.   Return to Clinic as needed.       Melina Hanson PA-C   [facility]       Pictures were placed in Pt's chart today for future reference.

## 2018-09-12 NOTE — PROGRESS NOTES
[facility] Dermatology Note      Dermatology Problem List:  0. NUB, left chest- s/p bx 9/12/18     Encounter Date: Sep 12, 2018    CC:  Chief Complaint   Patient presents with     Skin Check     Patient is here for a mole check on his left chest.     History of Present Illness:  Mr. Steve Doran is a 28 year old male who presents in self referral for a mole check as a new patient. There is no family history of skin cancer. He thinks that his mother has previously had moles removed, but he is unsure if these were found to be benign or not. However, he admits to frequent sun exposure over his lifetime on areas that are not covered by clothing. He does not use sun screen. He denies any tanning bed use over his lifetime.     Today he reports a mole of concern on his left side of his chest. He first noticed it 1-2 years ago. He mentioned the mole to his PCP- who then recommended he get it removed- but he never followed up with this.     Otherwise the patient reports no additional painful, bleeding, nonhealing or pruritic lesions and denies any new or changing moles.      Past Medical History:   Patient Active Problem List   Diagnosis     CARDIOVASCULAR SCREENING; LDL GOAL LESS THAN 130     Obesity     Past Medical History:   Diagnosis Date     Obesity      No past surgical history on file.    Social History:   reports that he has never smoked. He has never used smokeless tobacco. He reports that he drinks alcohol. He reports that he does not use illicit drugs.    Family History:  Family History   Problem Relation Age of Onset     Hypertension Father      Hypertension Paternal Grandmother      Diabetes Paternal Grandmother        Medications:  No current outpatient prescriptions on file.       No Known Allergies    Review of Systems:  -Skin/Heme New Pt: The patient admits to frequent sun exposure. The patient denies excessive scarring or problems healing except as per HPI. The patient denies excessive  bleeding. Skin as per HPI. No additional skin concerns  -Constitutional: The patient is generally feeling well.    Physical exam:  Vitals: There were no vitals taken for this visit.  GEN: This is a well developed, well-nourished male in no acute distress, in a pleasant mood.    Carter skin type IV.   SKIN: Waist-up skin, which includes the head/face, neck, both arms, chest, back, abdomen, digits and/or nails was examined.  - There is a 1 cm slightly variegated brown patch on the left chest   -There is a firm tan/flesh colored papule that dimples with lateral pressure on the left upper arm.  -Scattered brown macules on sun exposed areas.  -No other lesions of concern on areas examined.       Impression/Plan:  1. Neoplasm of uncertain behavior on the left chest. The differential diagnosis includes mm vs dysplastic nevus .   After discussion of benefits and risks including but not limited to bleeding, infection, scar, incomplete removal, recurrence, and non-diagnostic biopsy, written consent and photographs were obtained. The area was cleaned with isopropyl alcohol. 3.0 mL of 1% lidocaine with epinephrine was injected to obtain adequate anesthesia of the lesion on the left chest. A shave biopsy was performed. Hemostasis was achieved with aluminium chloride. Vaseline and a sterile dressing were applied. The patient tolerated the procedure and no complications were noted. The patient was provided with verbal and written post care instructions.     2. Solar lentigines     Reassured of benign nature.     Recommend regular use of sun protection     3. Dermatofibroma, left upper arm    Reassured of benign nature.     No further intervention required    Follow-up PRN for new or changing lesions     Staff Involved:  Scribe/Staff    Scribe Disclosure:   lEly LONG, am serving as a scribe to document services personally performed by Melina Hanson PA-C, based on data collection and the provider's statements to  me.    Provider Disclosure:   The documentation recorded by the scribe accurately reflects the services I personally performed and the decisions made by me.    All risks, benefits and alternatives were discussed with patient.  Patient is in agreement and understands the assessment and plan.  All questions were answered.  Sun Screen Education was given.   Return to Clinic as needed.   Melina Hanson PA-C   [facility]erm[facility]

## 2018-09-15 ENCOUNTER — OFFICE VISIT (OUTPATIENT)
Dept: URGENT CARE | Facility: URGENT CARE | Age: 28
End: 2018-09-15
Payer: COMMERCIAL

## 2018-09-15 VITALS
TEMPERATURE: 97.5 F | DIASTOLIC BLOOD PRESSURE: 74 MMHG | HEART RATE: 56 BPM | SYSTOLIC BLOOD PRESSURE: 132 MMHG | BODY MASS INDEX: 30.12 KG/M2 | WEIGHT: 219 LBS | OXYGEN SATURATION: 100 %

## 2018-09-15 DIAGNOSIS — H10.33 ACUTE BACTERIAL CONJUNCTIVITIS OF BOTH EYES: Primary | ICD-10-CM

## 2018-09-15 PROCEDURE — 99213 OFFICE O/P EST LOW 20 MIN: CPT | Performed by: FAMILY MEDICINE

## 2018-09-15 RX ORDER — POLYMYXIN B SULFATE AND TRIMETHOPRIM 1; 10000 MG/ML; [USP'U]/ML
1 SOLUTION OPHTHALMIC EVERY 4 HOURS
Qty: 10 ML | Refills: 0 | Status: SHIPPED | OUTPATIENT
Start: 2018-09-15 | End: 2018-09-21

## 2018-09-15 NOTE — PATIENT INSTRUCTIONS
Bacterial Conjunctivitis    You have an infection in the membranes covering the white part of the eye. This part of the eye is called the conjunctiva. The infection is called conjunctivitis. The most common symptoms of conjunctivitis include a thick, pus-like discharge from the eye, swollen eyelids, redness, eyelids sticking together upon awakening, and a gritty or scratchy feeling in the eye. Your infection was caused by bacteria. It may be treated with medicine. With treatment, the infection takes about 7 to 10 days to resolve.  Home care    Use prescribed antibiotic eye drops or ointment as directed to treat the infection.    Apply a warm compress (towel soaked in warm water) to the affected eye 3 to 4 times a day. Do this just before applying medicine to the eye.    Use a warm, wet cloth to wipe away crusting of the eyelids in the morning. This is caused by mucus drainage during the night. You may also use saline irrigating solution or artificial tears to rinse away mucus in the eye. Do not put a patch over the eye.    Wash your hands before and after touching the infected eye. This is to prevent spreading the infection to the other eye, and to other people. Don't share your towels or washcloths with others.    You may use acetaminophen or ibuprofen to control pain, unless another medicine was prescribed. (Note: If you have chronic liver or kidney disease or have ever had a stomach ulcer or gastrointestinal bleeding, talk with your doctor before using these medicines.)    Don't wear contact lenses until your eyes have healed and all symptoms are gone.  Follow-up care  Follow up with your healthcare provider, or as advised.  When to seek medical advice  Call your healthcare provider right away if any of these occur:    Worsening vision    Increasing pain in the eye    Increasing swelling or redness of the eyelid    Redness spreading around the eye  Date Last Reviewed: 7/1/2017 2000-2017 [employer],  [facility]. [address] [place] [address]. All rights reserved. This information is not intended as a substitute for professional medical care. Always follow your healthcare professional's instructions.

## 2018-09-15 NOTE — MR AVS SNAPSHOT
After Visit Summary   9/15/2018    Steve Doran    MRN: [id number]           Patient Information     Date Of Birth          1990        Visit Information        Provider Department      9/15/2018 3:40 PM Ella Boyd MD [facility]        Today's Diagnoses     Acute bacterial conjunctivitis of both eyes    -  1      Care Instructions      Bacterial Conjunctivitis    You have an infection in the membranes covering the white part of the eye. This part of the eye is called the conjunctiva. The infection is called conjunctivitis. The most common symptoms of conjunctivitis include a thick, pus-like discharge from the eye, swollen eyelids, redness, eyelids sticking together upon awakening, and a gritty or scratchy feeling in the eye. Your infection was caused by bacteria. It may be treated with medicine. With treatment, the infection takes about 7 to 10 days to resolve.  Home care    Use prescribed antibiotic eye drops or ointment as directed to treat the infection.    Apply a warm compress (towel soaked in warm water) to the affected eye 3 to 4 times a day. Do this just before applying medicine to the eye.    Use a warm, wet cloth to wipe away crusting of the eyelids in the morning. This is caused by mucus drainage during the night. You may also use saline irrigating solution or artificial tears to rinse away mucus in the eye. Do not put a patch over the eye.    Wash your hands before and after touching the infected eye. This is to prevent spreading the infection to the other eye, and to other people. Don't share your towels or washcloths with others.    You may use acetaminophen or ibuprofen to control pain, unless another medicine was prescribed. (Note: If you have chronic liver or kidney disease or have ever had a stomach ulcer or gastrointestinal bleeding, talk with your doctor before using these medicines.)    Don't wear contact lenses until your eyes have healed and all symptoms  are gone.  Follow-up care  Follow up with your healthcare provider, or as advised.  When to seek medical advice  Call your healthcare provider right away if any of these occur:    Worsening vision    Increasing pain in the eye    Increasing swelling or redness of the eyelid    Redness spreading around the eye  Date Last Reviewed: 7/1/2017 2000-2017 [employer]. [address] [place] [address]. All rights reserved. This information is not intended as a substitute for professional medical care. Always follow your healthcare professional's instructions.                Follow-ups after your visit        Who to contact     If you have questions or need follow up information about today's clinic visit or your schedule please contact [facility] directly at [phone].  Normal or non-critical lab and imaging results will be communicated to you by MyChart, letter or phone within 4 business days after the clinic has received the results. If you do not hear from us within 7 days, please contact the clinic through [employer] or phone. If you have a critical or abnormal lab result, we will notify you by phone as soon as possible.  Submit refill requests through [employer] or call your pharmacy and they will forward the refill request to us. Please allow 3 business days for your refill to be completed.          Additional Information About Your Visit        [employer] Information     [employer] gives you secure access to your electronic health record. If you see a primary care provider, you can also send messages to your care team and make appointments. If you have questions, please call your primary care clinic.  If you do not have a primary care provider, please call [phone] and they will assist you.        Care EveryWhere ID     This is your Care EveryWhere ID. This could be used by other organizations to access your [facility] medical records  [id number]        Your Vitals Were     Pulse  Temperature Pulse Oximetry BMI (Body Mass Index)          56 97.5  F (36.4  C) (Tympanic) 100% 30.12 kg/m2         Blood Pressure from Last 3 Encounters:   09/15/18 132/74   04/03/18 132/72   11/27/17 106/70    Weight from Last 3 Encounters:   09/15/18 219 lb (99.3 kg)   04/03/18 210 lb (95.3 kg)   11/27/17 218 lb (98.9 kg)              Today, you had the following     No orders found for display         Today's Medication Changes          These changes are accurate as of 9/15/18  3:51 PM.  If you have any questions, ask your nurse or doctor.               Start taking these medicines.        Dose/Directions    trimethoprim-polymyxin b ophthalmic solution   Commonly known as:  POLYTRIM   Used for:  Acute bacterial conjunctivitis of both eyes        Dose:  1 drop   Apply 1 drop to eye every 4 hours for 6 days   Quantity:  10 mL   Refills:  0            Where to get your medicines      These medications were sent to [facility] [address] [place], [place] [address]GUS THAKKAR [address] AT [address], [facility] [address]     Phone:  [phone]     trimethoprim-polymyxin b ophthalmic solution                Primary Care Provider Office Phone # Fax #    Cammy Castaneda [phone] [address] [address] [facility] [address]        Equal Access to Services     CLINTON AVILA AH: Hadii joann rosado hadasho Soomaali, waaxda luqadaha, qaybta kaalmada adeegyada, waxay julius cruz. So [facility] [phone].    ATENCIÓN: Si habla español, tiene a bonilla disposición servicios gratuitos de asistencia lingüística. Rolando al [phone].    We comply with applicable federal civil rights laws and [place] laws. We do not discriminate on the basis of race, color, national origin, age, disability, sex, sexual orientation, or gender identity.            Thank you!     Thank you for choosing [facility]  for your care. Our goal is always to  provide you with excellent care. Hearing back from our patients is one way we can continue to improve our services. Please take a few minutes to complete the written survey that you may receive in the mail after your visit with us. Thank you!             Your Updated Medication List - Protect others around you: Learn how to safely use, store and throw away your medicines at www.disposemymeds.org.          This list is accurate as of 9/15/18  3:51 PM.  Always use your most recent med list.                   Brand Name Dispense Instructions for use Diagnosis    trimethoprim-polymyxin b ophthalmic solution    POLYTRIM    10 mL    Apply 1 drop to eye every 4 hours for 6 days    Acute bacterial conjunctivitis of both eyes

## 2018-09-15 NOTE — PROGRESS NOTES
SUBJECTIVE:   Chief Complaint   Patient presents with     Eye Problem     Bilateral eye redness and drainage x 1 day.      Steve Doran is a 28 year old male with burning, redness, discharge and mattering in both eyes for 1 days.  No other symptoms.  No significant prior ophthalmological history. No change in visual acuity, no photophobia, no severe eye pain.      No foreign body sensation,  No allergic itching of nose or bilateral eyes.  No irritation from make-up, skin or hair care products    Patient does not  use contact lenses.    Past Medical History:   Diagnosis Date     Obesity      Patient Active Problem List   Diagnosis     CARDIOVASCULAR SCREENING; LDL GOAL LESS THAN 130     Obesity       ALLERGIES:  Review of patient's allergies indicates no known allergies.      No current outpatient prescriptions on file prior to visit.  No current facility-administered medications on file prior to visit.     Social History   Substance Use Topics     Smoking status: Never Smoker     Smokeless tobacco: Never Used     Alcohol use Yes      Comment: 2 drinks a week       Family History   Problem Relation Age of Onset     Hypertension Father      Hypertension Paternal Grandmother      Diabetes Paternal Grandmother          ROS:  CONSTITUTIONAL:NEGATIVE for fever, chills,    INTEGUMENTARY/SKIN: NEGATIVE for worrisome rashes,    ENT/MOUTH: NEGATIVE for ear, mouth and throat problems  RESP:NEGATIVE for significant cough or SOB    OBJECTIVE:   /74  Pulse 56  Temp 97.5  F (36.4  C) (Tympanic)  Wt 219 lb (99.3 kg)  SpO2 100%  BMI 30.12 kg/m2    Patient appears well,  .     Eyes: both eyes with findings of typical conjunctivitis noted; erythema and discharge. PERRLA, no foreign body noted. No periorbital cellulitis. The corneas are clear and fundi normal. Visual acuity normal.      HENT: External ears with no swelling or lesions   Nose and lips without  Swelling, ulcers, erythema or lesions  NECK: normal pain free  ROM  RESP: no labored respirations, no tachypnea  EXTREMITIES:   Full ROM without expression of pain or limitation x 4 extremities  NEURO: Normal strength and tone,   normal speech and mentation   PSYCH: mentation and affect appears normal and patient appearance--appropriately groomed            ASSESSMENT:   Acute bacterial conjunctivitis of both eyes      - trimethoprim-polymyxin b (POLYTRIM) ophthalmic solution; Apply 1 drop to eye every 4 hours for 6 days         Antibiotic drops per order. Hygiene discussed- frequent hand washing and to not share towels, washcloths or pillows to prevent transmission within the household.   Call prn.

## 2018-09-17 LAB — COPATH REPORT: NORMAL

## 2019-01-09 ENCOUNTER — OFFICE VISIT (OUTPATIENT)
Dept: DERMATOLOGY | Facility: CLINIC | Age: 29
End: 2019-01-09
Payer: COMMERCIAL

## 2019-01-09 DIAGNOSIS — L73.9 FOLLICULITIS: Primary | ICD-10-CM

## 2019-01-09 RX ORDER — CLINDAMYCIN PHOSPHATE 10 UG/ML
LOTION TOPICAL 2 TIMES DAILY
Qty: 60 ML | Refills: 11 | Status: SHIPPED | OUTPATIENT
Start: 2019-01-09 | End: 2020-01-09

## 2019-01-09 ASSESSMENT — PAIN SCALES - GENERAL: PAINLEVEL: SEVERE PAIN (7)

## 2019-01-09 NOTE — PROGRESS NOTES
[facility] Dermatology Note      Dermatology Problem List:  1. Folliculitis  - clindamycin 1% lotion    Encounter Date: Jan 9, 2019    CC:  Chief Complaint   Patient presents with     Derm Problem     I avn is here for bumps on his face,neck and abdomen.     History of Present Illness:  Mr. Steve Doran is a 28 year old male who presents today for a rash evaluation. The patient was last seen int he dermatology clinic on 09/12/18 during which a NUB was biopsied from his left chest. 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     Today he reports bumps on his face, neck and abdomen. He has been experiencing these for roughly a year.  He has found that every time he shaves his skin erupts in pimple-like bumps. These are  painful and irritable. He shaves with a razor. He has tried both manual and electric razors with the same result. He has tried several aftershaves and creams. He does not shave his abdomen, but gets similar bumps in this region. He is frustrated with these reoccurring lesions, as he is required to shave daily for his work- which has resulted in a worsening of his symptoms.     Otherwise the patient reports no additional painful, bleeding, nonhealing or pruritic lesions and denies any new or changing moles.      Past Medical History:   Patient Active Problem List   Diagnosis     CARDIOVASCULAR SCREENING; LDL GOAL LESS THAN 130     Obesity     Past Medical History:   Diagnosis Date     Obesity      No past surgical history on file.    Social History:   reports that  has never smoked. he has never used smokeless tobacco. He reports that he drinks alcohol. He reports that he does not use drugs.  He works for [employer]    Family History:  Family History   Problem Relation Age of Onset     Hypertension Father      Hypertension Paternal Grandmother      Diabetes Paternal Grandmother        Medications:  No current outpatient  medications on file.       No Known Allergies    Review of Systems:  -Skin Establ Pt: The patient denies any new rash, pruritus, or lesions that are symptomatic, changing or bleeding, except as per HPI.  -Constitutional: The patient is generally feeling well.    Physical exam:  Vitals: There were no vitals taken for this visit.  GEN: This is a well developed, well-nourished male in no acute distress, in a pleasant mood.    Carter skin type IV.   SKIN: A focused examination of the face, neck and abdomen was performed. Significant for:   - Very faint erifollicular erythematous papules and pustules on the upper neck and scattered across the left lower abdomen   -No other lesions of concern on areas examined.       Impression/Plan:  1. Folliculitis- neck and abdomen  - Start clindamycin 1% lotion, apply BID to lesion-prone areas (neck and abdomen)  - Discussed lengthening time between shaving to reduce irritation. A work note was written regarding recommendation pt shave every 3-4 days    Follow-up in 3 months, earlier for new or changing lesions     Staff Involved:  Scribe/Staff    Scribe Disclosure:   MERCEDES, Elly Ybarra, am serving as a scribe to document services personally performed by Melina Hanson PA-C, based on data collection and the provider's statements to me.    Provider Disclosure:   The documentation recorded by the scribe accurately reflects the services I personally performed and the decisions made by me.    All risks, benefits and alternatives were discussed with patient.  Patient is in agreement and understands the assessment and plan.  All questions were answered.  Sun Screen Education was given.   Return to Clinic in 3 months or sooner as needed.   Melina Hanson PA-C   [facility]

## 2019-01-09 NOTE — NURSING NOTE
Dermatology Rooming Note    Steve Doran's goals for this visit include:   Chief Complaint   Patient presents with     Derm Problem     I avn is here for bumps on his face,neck and abdomen.     Shanthi Potts, CMA

## 2019-01-09 NOTE — LETTER
January 9, 2019      To whom it may concern:    This is to certify that Steve Doran was seen in our Dermatology office on 1/9/2019.    He has a chronic skin condition from hair removal called folliculitis. This is improved when the facial and neck hair is able to grow for a few days. Please allow for Mr Doran to shave every 3-4 days instead of daily.       Sincerely,          Melina Hanson PA-C

## 2019-01-09 NOTE — LETTER
1/9/2019       RE: Steve Doran  [id number] [address] [address]     Dear Colleague,    Thank you for referring your patient, Steve Doran, to the [facility] DERMATOLOGY at [facility]. Please see a copy of my visit note below.    [facility] Dermatology Note      Dermatology Problem List:  1. Folliculitis  - clindamycin 1% lotion    Encounter Date: Jan 9, 2019    CC:  Chief Complaint   Patient presents with     Derm Problem     I avn is here for bumps on his face,neck and abdomen.     History of Present Illness:  Mr. Steve Doran is a 28 year old male who presents today for a rash evaluation. The patient was last seen int he dermatology clinic on 09/12/18 during which a NUB was biopsied from his left chest. 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     Today he reports bumps on his face, neck and abdomen. He has been experiencing these for roughly a year.  He has found that every time he shaves his skin erupts in pimple-like bumps. These are  painful and irritable. He shaves with a razor. He has tried both manual and electric razors with the same result. He has tried several aftershaves and creams. He does not shave his abdomen, but gets similar bumps in this region. He is frustrated with these reoccurring lesions, as he is required to shave daily for his work- which has resulted in a worsening of his symptoms.     Otherwise the patient reports no additional painful, bleeding, nonhealing or pruritic lesions and denies any new or changing moles.      Past Medical History:   Patient Active Problem List   Diagnosis     CARDIOVASCULAR SCREENING; LDL GOAL LESS THAN 130     Obesity     Past Medical History:   Diagnosis Date     Obesity      No past surgical history on file.    Social History:   reports that  has never smoked. he has never used smokeless tobacco. He reports that he drinks  alcohol. He reports that he does not use drugs.  He works for [employer]    Family History:  Family History   Problem Relation Age of Onset     Hypertension Father      Hypertension Paternal Grandmother      Diabetes Paternal Grandmother        Medications:  No current outpatient medications on file.       No Known Allergies    Review of Systems:  -Skin Establ Pt: The patient denies any new rash, pruritus, or lesions that are symptomatic, changing or bleeding, except as per HPI.  -Constitutional: The patient is generally feeling well.    Physical exam:  Vitals: There were no vitals taken for this visit.  GEN: This is a well developed, well-nourished male in no acute distress, in a pleasant mood.    Carter skin type IV.   SKIN: A focused examination of the face, neck and abdomen was performed. Significant for:   - Very faint erifollicular erythematous papules and pustules on the upper neck and scattered across the left lower abdomen   -No other lesions of concern on areas examined.       Impression/Plan:  1. Folliculitis- neck and abdomen  - Start clindamycin 1% lotion, apply BID to lesion-prone areas (neck and abdomen)  - Discussed lengthening time between shaving to reduce irritation. A work note was written regarding recommendation pt shave every 3-4 days    Follow-up in 3 months, earlier for new or changing lesions     Staff Involved:  Scribe/Staff    Scribe Disclosure:   MERCEDES, Elly Ybarra, am serving as a scribe to document services personally performed by Melina Hanson PA-C, based on data collection and the provider's statements to me.    Provider Disclosure:   The documentation recorded by the scribe accurately reflects the services I personally performed and the decisions made by me.    All risks, benefits and alternatives were discussed with patient.  Patient is in agreement and understands the assessment and plan.  All questions were answered.  Sun Screen Education was given.   Return to Clinic in 3 months or  sooner as needed.   Melina Hanson PA-C   [facility]

## 2019-11-04 ENCOUNTER — OFFICE VISIT (OUTPATIENT)
Dept: PSYCHOLOGY | Facility: CLINIC | Age: 29
End: 2019-11-04
Payer: COMMERCIAL

## 2019-11-04 ENCOUNTER — OFFICE VISIT (OUTPATIENT)
Dept: PEDIATRICS | Facility: CLINIC | Age: 29
End: 2019-11-04
Payer: COMMERCIAL

## 2019-11-04 VITALS
BODY MASS INDEX: 28.47 KG/M2 | WEIGHT: 207 LBS | DIASTOLIC BLOOD PRESSURE: 89 MMHG | SYSTOLIC BLOOD PRESSURE: 142 MMHG | OXYGEN SATURATION: 99 % | HEART RATE: 101 BPM

## 2019-11-04 DIAGNOSIS — F43.20 ADJUSTMENT DISORDER: Primary | ICD-10-CM

## 2019-11-04 DIAGNOSIS — F43.23 SITUATIONAL MIXED ANXIETY AND DEPRESSIVE DISORDER: Primary | ICD-10-CM

## 2019-11-04 PROCEDURE — 99214 OFFICE O/P EST MOD 30 MIN: CPT | Performed by: INTERNAL MEDICINE

## 2019-11-04 RX ORDER — ALPRAZOLAM 0.5 MG
0.5 TABLET ORAL 3 TIMES DAILY PRN
Qty: 30 TABLET | Refills: 0 | Status: SHIPPED | OUTPATIENT
Start: 2019-11-04 | End: 2021-10-15

## 2019-11-04 ASSESSMENT — ANXIETY QUESTIONNAIRES
2. NOT BEING ABLE TO STOP OR CONTROL WORRYING: NEARLY EVERY DAY
5. BEING SO RESTLESS THAT IT IS HARD TO SIT STILL: NEARLY EVERY DAY
7. FEELING AFRAID AS IF SOMETHING AWFUL MIGHT HAPPEN: NEARLY EVERY DAY
1. FEELING NERVOUS, ANXIOUS, OR ON EDGE: NEARLY EVERY DAY
6. BECOMING EASILY ANNOYED OR IRRITABLE: NEARLY EVERY DAY
GAD7 TOTAL SCORE: 21
3. WORRYING TOO MUCH ABOUT DIFFERENT THINGS: NEARLY EVERY DAY

## 2019-11-04 ASSESSMENT — PATIENT HEALTH QUESTIONNAIRE - PHQ9
SUM OF ALL RESPONSES TO PHQ QUESTIONS 1-9: 24
5. POOR APPETITE OR OVEREATING: NEARLY EVERY DAY

## 2019-11-04 NOTE — PROGRESS NOTES
Patient had appointment with his specialty provider, Dr. Branham. [facility] services were offered. No immediate safety/risk issues were reported or identified. Explained the role of the [facility] and provided contact information for the [facility]. Patient stated he needed to make sure his FMLA was in place so he can keep his job and will then schedule with [facility] to establish care.      Sharan Nguyễn PsyD,   Behavioral Health Clinician

## 2019-11-04 NOTE — Clinical Note
He is going to call later to schedule after he is certain he has FMLA in place. 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 Sharan

## 2019-11-04 NOTE — PROGRESS NOTES
Subjective     Steve Doran is a 29 year old male who presents to clinic today for the following health issues:    HPI   Abnormal Mood Symptoms  Onset: 2weeks    Description:   Depression: YES  Anxiety: YES    Accompanying Signs & Symptoms:  Still participating in activities that you used to enjoy: no  Fatigue: YES  Irritability: YES  Difficulty concentrating: YES  Changes in appetite: YES  Problems with sleep: YES  Heart racing/beating fast : YES  Thoughts of hurting yourself or others: none    History:   Recent stress: YES- Spouse committed suicide  Prior depression hospitalization: None  Family history of depression: no   Family history of anxiety: no     Precipitating factors:   Alcohol/drug use: YES- alcohol in moderation    Alleviating factors:  NONE    Therapies Tried and outcome: None    29-year-old young man comes in stating that he is got a lot of anxiety and unable to sleep or concentrate.  His wife committed suicide recently and she got better just last Wednesday.  They have a 2-year-old son together.  They work together but not  for 12 years.  He is been unable to return to work at [employer].  He is unable to concentrate and feels down and very anxious.  He wakes up startled several times during the night.  He has no prior history of depression or anxiety  His significant other told him about her depression just 3 weeks ago.  At one point she called him at work indicating she was going to commit suicide so he took her to the hospital and she was admitted for 5 days.  She was placed on medication after which she seemed to do well.  A week later she committed suicide.      Patient Active Problem List   Diagnosis     CARDIOVASCULAR SCREENING; LDL GOAL LESS THAN 130     Obesity     History reviewed. No pertinent surgical history.    Social History     Tobacco Use     Smoking status: Never Smoker     Smokeless tobacco: Never Used   Substance Use Topics     Alcohol use: Yes     Comment: 2 drinks a week      Family History   Problem Relation Age of Onset     Hypertension Father      Hypertension Paternal Grandmother      Diabetes Paternal Grandmother          Current Outpatient Medications   Medication Sig Dispense Refill     ALPRAZolam (XANAX) 0.5 MG tablet Take 1 tablet (0.5 mg) by mouth 3 times daily as needed for anxiety 30 tablet 0     clindamycin (CLEOCIN T) 1 % external lotion Apply topically 2 times daily 60 mL 11     No Known Allergies      Reviewed and updated as needed this visit by Provider         Review of Systems   ROS COMP: Constitutional, HEENT, cardiovascular, pulmonary, GI, , musculoskeletal, neuro, skin, endocrine and psych systems are negative, except as otherwise noted.      Objective    BP (!) 142/89 (BP Location: Right arm, Patient Position: Sitting, Cuff Size: Adult Regular)   Pulse 101   Wt 93.9 kg (207 lb)   SpO2 99%   BMI 28.47 kg/m    Body mass index is 28.47 kg/m .  Physical Exam   GENERAL: healthy, alert and no distress  NECK: no adenopathy, no asymmetry, masses, or scars and thyroid normal to palpation  RESP: lungs clear to auscultation - no rales, rhonchi or wheezes  CV: regular rate and rhythm, normal S1 S2, no S3 or S4, no murmur, click or rub, no peripheral edema and peripheral pulses strong  ABDOMEN: soft, nontender, no hepatosplenomegaly, no masses and bowel sounds normal  MS: no gross musculoskeletal defects noted, no edema  NEURO: Normal strength and tone, sensory exam grossly normal and mentation intact  PSYCH: mentation appears normal,anxious and feeling down.    Diagnostic Test Results:  Labs reviewed in Epic        Assessment & Plan     1.  Situational anxiety and depressive disorder.  I called in clinical behavioral therapist Erick Nguyễn to help with the patient.  A warm handoff was given.  I also prescribed alprazolam 0.5 mg to be taken 3 times a day as needed for the anxiety.  Short prescription given.  He works for [employer] and has not been able to return to work.  At  this point he does not feel that he can perform his job.  Severely anxious and unable to concentrate.  He does not even feel safe driving his own car.        No follow-ups on file.    Ej Branham MD  [facility]

## 2019-11-05 ASSESSMENT — ANXIETY QUESTIONNAIRES: GAD7 TOTAL SCORE: 21

## 2019-11-07 ENCOUNTER — TELEPHONE (OUTPATIENT)
Dept: PEDIATRICS | Facility: CLINIC | Age: 29
End: 2019-11-07

## 2019-11-07 NOTE — LETTER
November 8, 2019      Steve Doran  [address] [address] [address]        To Whom It May Concern:    Steve Doran was seen in our clinic on 11/9/2019.  He is currently being treated for situational anxiety and depression related to sudden demise of his partner.  He has been started on treatment and behavioral health  therapy has been initiated.  The patient is unable to work as of October 23, 2019.  Anticipate that he will require intensive therapy and will not be able to return to work until December 2, 2019.  He requires a leave of absence from work for this duration of time.      Sincerely,        Ej Branham MD

## 2019-11-07 NOTE — TELEPHONE ENCOUNTER
Patient advised letter written by Dr. Branham.  After reading the letter to the patient he advised me the FMLA will be rejected and he needs the letter re done to say:    Steve Doran was seen in our clinic on 11/04/19.   Patient was diagnosed with situational anxiety and depression due to an unexpected death in the family.  Patient is unable to return to work from 10/23/19 to 12/02/19.      Dr. Branham please write the letter the way you would recommend it be written for FMLA and please include that he has been put on medications and will be seeing a therapist    Please call patient when letter has been written.    Thalia Ornelas CMA

## 2019-11-07 NOTE — TELEPHONE ENCOUNTER
I have not received an FMLA form for completion.  Please asked patient if through work he send in FLMA form to us.  I can send a letter that states that he was seen in the clinic on 11/4/2019.

## 2019-11-07 NOTE — TELEPHONE ENCOUNTER
[facility] Call Center    Phone Message    May a detailed message be left on voicemail: yes    Reason for Call: Form or Letter   Type or form/letter needing completion: Letter for [facility] regarding visit from 11/4  Provider: Dr. Branham  Date form needed: asap - pt thought it was included in the [facility] paperwork   Once completed: Fax form to: [phone]     Please include Claim #[id number]. Pt is requesting a confirmation call that it has been sent.    Action Taken: Message routed to:  Primary Care p [phone]

## 2019-11-07 NOTE — LETTER
November 7, 2019      Steve Doran  [address] [address] [address]        To Whom It May Concern:    Steve Doran was seen in our clinic on 11/4/2019.        Sincerely,        Ej Branham MD

## 2019-11-09 ENCOUNTER — HEALTH MAINTENANCE LETTER (OUTPATIENT)
Age: 29
End: 2019-11-09

## 2020-12-06 ENCOUNTER — HEALTH MAINTENANCE LETTER (OUTPATIENT)
Age: 30
End: 2020-12-06

## 2021-09-26 ENCOUNTER — HEALTH MAINTENANCE LETTER (OUTPATIENT)
Age: 31
End: 2021-09-26

## 2021-10-15 ENCOUNTER — OFFICE VISIT (OUTPATIENT)
Dept: FAMILY MEDICINE | Facility: CLINIC | Age: 31
End: 2021-10-15
Payer: COMMERCIAL

## 2021-10-15 VITALS
TEMPERATURE: 98.6 F | HEIGHT: 72 IN | HEART RATE: 71 BPM | BODY MASS INDEX: 29.8 KG/M2 | SYSTOLIC BLOOD PRESSURE: 125 MMHG | DIASTOLIC BLOOD PRESSURE: 80 MMHG | OXYGEN SATURATION: 96 % | WEIGHT: 220 LBS

## 2021-10-15 DIAGNOSIS — R05.9 COUGH: ICD-10-CM

## 2021-10-15 DIAGNOSIS — Z00.00 ROUTINE GENERAL MEDICAL EXAMINATION AT A HEALTH CARE FACILITY: Primary | ICD-10-CM

## 2021-10-15 PROCEDURE — U0005 INFEC AGEN DETEC AMPLI PROBE: HCPCS | Performed by: PHYSICIAN ASSISTANT

## 2021-10-15 PROCEDURE — 99395 PREV VISIT EST AGE 18-39: CPT | Performed by: PHYSICIAN ASSISTANT

## 2021-10-15 PROCEDURE — 99213 OFFICE O/P EST LOW 20 MIN: CPT | Mod: 25 | Performed by: PHYSICIAN ASSISTANT

## 2021-10-15 PROCEDURE — U0003 INFECTIOUS AGENT DETECTION BY NUCLEIC ACID (DNA OR RNA); SEVERE ACUTE RESPIRATORY SYNDROME CORONAVIRUS 2 (SARS-COV-2) (CORONAVIRUS DISEASE [COVID-19]), AMPLIFIED PROBE TECHNIQUE, MAKING USE OF HIGH THROUGHPUT TECHNOLOGIES AS DESCRIBED BY CMS-2020-01-R: HCPCS | Performed by: PHYSICIAN ASSISTANT

## 2021-10-15 RX ORDER — SILDENAFIL CITRATE 20 MG/1
20 TABLET ORAL 3 TIMES DAILY
COMMUNITY
End: 2021-12-20

## 2021-10-15 RX ORDER — ALBUTEROL SULFATE 90 UG/1
2 AEROSOL, METERED RESPIRATORY (INHALATION) EVERY 4 HOURS PRN
Qty: 18 G | Refills: 1 | Status: SHIPPED | OUTPATIENT
Start: 2021-10-15

## 2021-10-15 ASSESSMENT — ENCOUNTER SYMPTOMS
PSYCHIATRIC NEGATIVE: 1
COUGH: 1
WHEEZING: 1
CARDIOVASCULAR NEGATIVE: 1
HEADACHES: 1
MUSCULOSKELETAL NEGATIVE: 1
HEMATOCHEZIA: 0
SHORTNESS OF BREATH: 1
HEARTBURN: 1
CONSTITUTIONAL NEGATIVE: 1

## 2021-10-15 ASSESSMENT — ANXIETY QUESTIONNAIRES
7. FEELING AFRAID AS IF SOMETHING AWFUL MIGHT HAPPEN: NOT AT ALL
5. BEING SO RESTLESS THAT IT IS HARD TO SIT STILL: NOT AT ALL
GAD7 TOTAL SCORE: 0
IF YOU CHECKED OFF ANY PROBLEMS ON THIS QUESTIONNAIRE, HOW DIFFICULT HAVE THESE PROBLEMS MADE IT FOR YOU TO DO YOUR WORK, TAKE CARE OF THINGS AT HOME, OR GET ALONG WITH OTHER PEOPLE: NOT DIFFICULT AT ALL
2. NOT BEING ABLE TO STOP OR CONTROL WORRYING: NOT AT ALL
6. BECOMING EASILY ANNOYED OR IRRITABLE: NOT AT ALL
3. WORRYING TOO MUCH ABOUT DIFFERENT THINGS: NOT AT ALL
1. FEELING NERVOUS, ANXIOUS, OR ON EDGE: NOT AT ALL

## 2021-10-15 ASSESSMENT — MIFFLIN-ST. JEOR: SCORE: 2000.91

## 2021-10-15 ASSESSMENT — PATIENT HEALTH QUESTIONNAIRE - PHQ9
5. POOR APPETITE OR OVEREATING: NOT AT ALL
SUM OF ALL RESPONSES TO PHQ QUESTIONS 1-9: 4

## 2021-10-15 NOTE — PROGRESS NOTES
SUBJECTIVE:   CC: Steve Doran is an 31 year old male who presents for preventative health visit.       Patient has been advised of split billing requirements and indicates understanding: Yes  Healthy Habits:     Getting at least 3 servings of Calcium per day:  NO    Bi-annual eye exam:  Yes    Dental care twice a year:  Yes    Sleep apnea or symptoms of sleep apnea:  None    Diet:  Other    Frequency of exercise:  2-3 days/week    Duration of exercise:  15-30 minutes    Taking medications regularly:  Yes    Medication side effects:  None    PHQ-2 Total Score: 1    Additional concerns today:  No  Imm/Inj  Associated symptoms include congestion, coughing and headaches (sinus now ).           Acute Illness   Acute illness concerns: since 8-10-21 when was positive with  COVID   Onset: August     Fever: no    Chills/Sweats: few days ago     Headache (location?): YES mild     Sinus Pressure:YES yesterday    Conjunctivitis:  no    Ear Pain: YES- pressure 2 days ago     Rhinorrhea: YES    Congestion: YES    Sore Throat: not anymore      Cough: YES    Wheeze: YES    Decreased Appetite: YES    Nausea: no     Vomiting: no     Diarrhea:  no     Dysuria/Freq.: no    Fatigue/Achiness: no     Sick/Strep Exposure: no      Therapies Tried and outcome: Mucinex,Vit C   Had covid in Aug.  Cough did not resolve.  Did not test last time.  Was exposed to 20 people with covid.    This week started with congestion and fevers, sore throat and head ache.  Cough and chest congestion got worse again this year.  Sometimes cigars smoking.  Not regular.  No hx of lung problems.    Might have some allergies.      Check lump on neck x 2-3 weeks,getting smaller -was swollen in Aug and now improving     Today's PHQ-2 Score:   PHQ-2 ( 1999 Pfizer) 10/15/2021   Q1: Little interest or pleasure in doing things 1   Q2: Feeling down, depressed or hopeless 0   PHQ-2 Score 1   Q1: Little interest or pleasure in doing things Several days   Q2: Feeling down,  depressed or hopeless Not at all   PHQ-2 Score 1       Abuse: Current or Past(Physical, Sexual or Emotional)- No  Do you feel safe in your environment? Yes    Have you ever done Advance Care Planning? (For example, a Health Directive, POLST, or a discussion with a medical provider or your loved ones about your wishes): No, advance care planning information given to patient to review.  Patient plans to discuss their wishes with loved ones or provider.      Social History     Tobacco Use     Smoking status: Current Some Day Smoker     Smokeless tobacco: Never Used   Substance Use Topics     Alcohol use: Yes     Comment: 2 drinks a week     If you drink alcohol do you typically have >3 drinks per day or >7 drinks per week? No    Alcohol Use 10/15/2021   Prescreen: >3 drinks/day or >7 drinks/week? No   Prescreen: >3 drinks/day or >7 drinks/week? -   No flowsheet data found.    Last PSA: No results found for: PSA    Reviewed orders with patient. Reviewed health maintenance and updated orders accordingly - Yes  Labs reviewed in EPIC    Reviewed and updated as needed this visit by clinical staff  Tobacco  Allergies  Meds   Med Hx  Surg Hx  Fam Hx  Soc Hx        Reviewed and updated as needed this visit by Provider   Allergies  Meds                 Review of Systems   Constitutional: Negative.    HENT: Positive for congestion.    Eyes: Negative for visual disturbance.   Respiratory: Positive for cough, shortness of breath and wheezing.    Cardiovascular: Negative.    Gastrointestinal: Positive for heartburn (in last few months.  tums help.  ). Negative for hematochezia.   Genitourinary: Positive for impotence. Negative for discharge.   Musculoskeletal: Negative.    Skin: Negative.    Neurological: Positive for headaches (sinus now ).   Psychiatric/Behavioral: Negative.          OBJECTIVE:   /80   Pulse 71   Temp 98.6  F (37  C) (Oral)   Ht 1.829 m (6')   Wt 99.8 kg (220 lb)   SpO2 96%   BMI 29.84 kg/m       Physical Exam  Constitutional:       General: He is not in acute distress.     Appearance: He is well-developed.   HENT:      Right Ear: Tympanic membrane, ear canal and external ear normal.      Left Ear: Tympanic membrane, ear canal and external ear normal.      Mouth/Throat:      Pharynx: Posterior oropharyngeal erythema (mild) present. No oropharyngeal exudate.   Eyes:      Pupils: Pupils are equal, round, and reactive to light.   Neck:      Thyroid: No thyromegaly.     Cardiovascular:      Rate and Rhythm: Normal rate and regular rhythm.      Heart sounds: Normal heart sounds.   Pulmonary:      Effort: Pulmonary effort is normal.      Breath sounds: Wheezing, rhonchi and rales present.   Abdominal:      General: Bowel sounds are normal.      Palpations: Abdomen is soft.      Tenderness: There is no abdominal tenderness.   Musculoskeletal:         General: No swelling (        ). Normal range of motion.   Lymphadenopathy:      Cervical: No cervical adenopathy.   Skin:     General: Skin is warm and dry.      Findings: Lesion:                                                      Neurological:      Mental Status: He is alert and oriented to person, place, and time.      Coordination: Abnormal coordination:           Deep Tendon Reflexes: Reflexes normal (                                              ).   Psychiatric:         Behavior: Behavior normal.               Diagnostic Test Results:  Labs reviewed in Epic    ASSESSMENT/PLAN:   (Z00.00) Routine general medical examination at a health care facility  (primary encounter diagnosis)  Comment:   Plan: overall healthy.  Continue to work on diet and exercise     (R05.9) Cough  Comment:   Plan: Symptomatic COVID-19 Virus (Coronavirus) by PCR        Nose, albuterol (PROAIR HFA/PROVENTIL         HFA/VENTOLIN HFA) 108 (90 Base) MCG/ACT inhaler        Follow up when test is back.  Use albuterol as needed.  Follow up if not improving       Patient has been advised of  split billing requirements and indicates understanding: Yes  COUNSELING:   Reviewed preventive health counseling, as reflected in patient instructions       Regular exercise       Healthy diet/nutrition    Estimated body mass index is 29.84 kg/m  as calculated from the following:    Height as of this encounter: 1.829 m (6').    Weight as of this encounter: 99.8 kg (220 lb).     Weight management plan: Discussed healthy diet and exercise guidelines    He reports that he has been smoking. He has never used smokeless tobacco.      Counseling Resources:  ATP IV Guidelines  Pooled Cohorts Equation Calculator  FRAX Risk Assessment  ICSI Preventive Guidelines  Dietary Guidelines for Americans, 2010  USDA's MyPlate  ASA Prophylaxis  Lung CA Screening    [facility] Inhaler http://www.fpanetwork.org/inhalers

## 2021-10-15 NOTE — PATIENT INSTRUCTIONS
If lymph node is still there after 2 weeks of feeling better let me know   Preventive Health Recommendations  Male Ages 26 - 39    Yearly exam:             See your health care provider every year in order to  o   Review health changes.   o   Discuss preventive care.    o   Review your medicines if your doctor has prescribed any.    You should be tested each year for STDs (sexually transmitted diseases), if you re at risk.     After age 35, talk to your provider about cholesterol testing. If you are at risk for heart disease, have your cholesterol tested at least every 5 years.     If you are at risk for diabetes, you should have a diabetes test (fasting glucose).  Shots: Get a flu shot each year. Get a tetanus shot every 10 years.     Nutrition:    Eat at least 5 servings of fruits and vegetables daily.     Eat whole-grain bread, whole-wheat pasta and brown rice instead of white grains and rice.     Get adequate Calcium and Vitamin D.     Lifestyle    Exercise for at least 150 minutes a week (30 minutes a day, 5 days a week). This will help you control your weight and prevent disease.     Limit alcohol to one drink per day.     No smoking.     Wear sunscreen to prevent skin cancer.     See your dentist every six months for an exam and cleaning.

## 2021-10-16 LAB — SARS-COV-2 RNA RESP QL NAA+PROBE: NEGATIVE

## 2021-10-16 ASSESSMENT — ANXIETY QUESTIONNAIRES: GAD7 TOTAL SCORE: 0

## 2021-12-17 ENCOUNTER — E-VISIT (OUTPATIENT)
Dept: FAMILY MEDICINE | Facility: CLINIC | Age: 31
End: 2021-12-17
Payer: COMMERCIAL

## 2021-12-17 DIAGNOSIS — N52.9 ERECTILE DYSFUNCTION, UNSPECIFIED ERECTILE DYSFUNCTION TYPE: Primary | ICD-10-CM

## 2021-12-17 PROCEDURE — 99421 OL DIG E/M SVC 5-10 MIN: CPT | Performed by: PHYSICIAN ASSISTANT

## 2021-12-20 RX ORDER — SILDENAFIL CITRATE 20 MG/1
20 TABLET ORAL DAILY PRN
Qty: 30 TABLET | Refills: 3 | Status: SHIPPED | OUTPATIENT
Start: 2021-12-20 | End: 2021-12-22

## 2022-02-05 DIAGNOSIS — N52.9 ERECTILE DYSFUNCTION, UNSPECIFIED ERECTILE DYSFUNCTION TYPE: ICD-10-CM

## 2022-02-06 RX ORDER — SILDENAFIL CITRATE 20 MG/1
TABLET ORAL
Qty: 90 TABLET | Refills: 1 | Status: SHIPPED | OUTPATIENT
Start: 2022-02-06

## 2022-02-06 NOTE — TELEPHONE ENCOUNTER
Prescription approved per [facility] Refill Protocol.    Xin Alfredo RN

## 2023-04-23 ENCOUNTER — HEALTH MAINTENANCE LETTER (OUTPATIENT)
Age: 33
End: 2023-04-23

## 2024-06-29 ENCOUNTER — HEALTH MAINTENANCE LETTER (OUTPATIENT)
Age: 34
End: 2024-06-29